# Patient Record
Sex: MALE | Race: BLACK OR AFRICAN AMERICAN | Employment: FULL TIME | URBAN - METROPOLITAN AREA
[De-identification: names, ages, dates, MRNs, and addresses within clinical notes are randomized per-mention and may not be internally consistent; named-entity substitution may affect disease eponyms.]

---

## 2022-02-24 ENCOUNTER — APPOINTMENT (OUTPATIENT)
Dept: CT IMAGING | Facility: CLINIC | Age: 32
End: 2022-02-24
Attending: EMERGENCY MEDICINE
Payer: COMMERCIAL

## 2022-02-24 ENCOUNTER — HOSPITAL ENCOUNTER (OUTPATIENT)
Facility: CLINIC | Age: 32
Setting detail: OBSERVATION
Discharge: HOME | End: 2022-02-25
Attending: EMERGENCY MEDICINE | Admitting: EMERGENCY MEDICINE
Payer: COMMERCIAL

## 2022-02-24 ENCOUNTER — APPOINTMENT (OUTPATIENT)
Dept: MRI IMAGING | Facility: CLINIC | Age: 32
End: 2022-02-24
Attending: INTERNAL MEDICINE
Payer: COMMERCIAL

## 2022-02-24 DIAGNOSIS — R41.82 ALTERED MENTAL STATUS, UNSPECIFIED ALTERED MENTAL STATUS TYPE: ICD-10-CM

## 2022-02-24 LAB
ALBUMIN SERPL-MCNC: 3.5 G/DL (ref 3.4–5)
ALBUMIN UR-MCNC: NEGATIVE MG/DL
ALP SERPL-CCNC: 80 U/L (ref 40–150)
ALT SERPL W P-5'-P-CCNC: 25 U/L (ref 0–70)
AMMONIA PLAS-SCNC: 25 UMOL/L (ref 10–50)
AMPHETAMINES UR QL SCN: NORMAL
ANION GAP SERPL CALCULATED.3IONS-SCNC: 4 MMOL/L (ref 3–14)
APPEARANCE CSF: CLEAR
APPEARANCE UR: CLEAR
AST SERPL W P-5'-P-CCNC: 12 U/L (ref 0–45)
BARBITURATES UR QL: NORMAL
BASOPHILS # BLD MANUAL: 0.1 10E3/UL (ref 0–0.2)
BASOPHILS NFR BLD MANUAL: 1 %
BENZODIAZ UR QL: NORMAL
BILIRUB DIRECT SERPL-MCNC: 0.1 MG/DL (ref 0–0.2)
BILIRUB SERPL-MCNC: 0.6 MG/DL (ref 0.2–1.3)
BILIRUB UR QL STRIP: NEGATIVE
BUN SERPL-MCNC: 12 MG/DL (ref 7–30)
C GATTII+NEOFOR DNA CSF QL NAA+NON-PROBE: NEGATIVE
CALCIUM SERPL-MCNC: 9.2 MG/DL (ref 8.5–10.1)
CANNABINOIDS UR QL SCN: NORMAL
CHLORIDE BLD-SCNC: 105 MMOL/L (ref 94–109)
CMV DNA CSF QL NAA+NON-PROBE: NEGATIVE
CO2 SERPL-SCNC: 28 MMOL/L (ref 20–32)
COCAINE UR QL: NORMAL
COLOR CSF: COLORLESS
COLOR UR AUTO: NORMAL
CREAT SERPL-MCNC: 1.23 MG/DL (ref 0.66–1.25)
CRP SERPL-MCNC: <2.9 MG/L (ref 0–8)
E COLI K1 AG CSF QL: NEGATIVE
EOSINOPHIL # BLD MANUAL: 0.3 10E3/UL (ref 0–0.7)
EOSINOPHIL NFR BLD MANUAL: 4 %
ERYTHROCYTE [DISTWIDTH] IN BLOOD BY AUTOMATED COUNT: 13.6 % (ref 10–15)
ERYTHROCYTE [SEDIMENTATION RATE] IN BLOOD BY WESTERGREN METHOD: 16 MM/HR (ref 0–15)
EV RNA SPEC QL NAA+PROBE: NEGATIVE
FLUAV RNA SPEC QL NAA+PROBE: NEGATIVE
FLUBV RNA RESP QL NAA+PROBE: NEGATIVE
FOLATE SERPL-MCNC: 11.6 NG/ML
GFR SERPL CREATININE-BSD FRML MDRD: 79 ML/MIN/1.73M2
GLUCOSE BLD-MCNC: 101 MG/DL (ref 70–99)
GLUCOSE BLDC GLUCOMTR-MCNC: 83 MG/DL (ref 70–99)
GLUCOSE CSF-MCNC: 64 MG/DL (ref 40–70)
GLUCOSE UR STRIP-MCNC: NEGATIVE MG/DL
GP B STREP DNA CSF QL NAA+NON-PROBE: NEGATIVE
GRAM STAIN RESULT: NORMAL
HAEM INFLU DNA CSF QL NAA+NON-PROBE: NEGATIVE
HCT VFR BLD AUTO: 49.1 % (ref 40–53)
HGB BLD-MCNC: 15.9 G/DL (ref 13.3–17.7)
HGB UR QL STRIP: NEGATIVE
HHV6 DNA CSF QL NAA+NON-PROBE: NEGATIVE
HSV1 DNA CSF QL NAA+NON-PROBE: NEGATIVE
HSV2 DNA CSF QL NAA+NON-PROBE: NEGATIVE
KETONES UR STRIP-MCNC: NEGATIVE MG/DL
L MONOCYTOG DNA CSF QL NAA+NON-PROBE: NEGATIVE
LEUKOCYTE ESTERASE UR QL STRIP: NEGATIVE
LYMPHOCYTES # BLD MANUAL: 3.3 10E3/UL (ref 0.8–5.3)
LYMPHOCYTES NFR BLD MANUAL: 51 %
MALARIA SMEAR BLD: NEGATIVE
MCH RBC QN AUTO: 25.3 PG (ref 26.5–33)
MCHC RBC AUTO-ENTMCNC: 32.4 G/DL (ref 31.5–36.5)
MCV RBC AUTO: 78 FL (ref 78–100)
MONOCYTES # BLD MANUAL: 0.5 10E3/UL (ref 0–1.3)
MONOCYTES NFR BLD MANUAL: 7 %
N MEN DNA CSF QL NAA+NON-PROBE: NEGATIVE
NEUTROPHILS # BLD MANUAL: 2.4 10E3/UL (ref 1.6–8.3)
NEUTROPHILS NFR BLD MANUAL: 37 %
NITRATE UR QL: NEGATIVE
OPIATES UR QL SCN: NORMAL
PARECHOVIRUS A RNA CSF QL NAA+NON-PROBE: NEGATIVE
PCP UR QL SCN: NORMAL
PH UR STRIP: 6.5 [PH] (ref 5–7)
PLASMODIUM AG BLD QL IA: NEGATIVE
PLASMODIUM AG BLD QL IA: NEGATIVE
PLAT MORPH BLD: ABNORMAL
PLATELET # BLD AUTO: 309 10E3/UL (ref 150–450)
POTASSIUM BLD-SCNC: 4.5 MMOL/L (ref 3.4–5.3)
PROT CSF-MCNC: 13 MG/DL (ref 15–60)
PROT SERPL-MCNC: 7.5 G/DL (ref 6.8–8.8)
RBC # BLD AUTO: 6.29 10E6/UL (ref 4.4–5.9)
RBC # CSF MANUAL: 0 /UL (ref 0–2)
RBC MORPH BLD: ABNORMAL
RBC URINE: 0 /HPF
S PNEUM DNA CSF QL NAA+NON-PROBE: NEGATIVE
SARS-COV-2 RNA RESP QL NAA+PROBE: NEGATIVE
SODIUM SERPL-SCNC: 137 MMOL/L (ref 133–144)
SP GR UR STRIP: 1.01 (ref 1–1.03)
TSH SERPL DL<=0.005 MIU/L-ACNC: 1.31 MU/L (ref 0.4–4)
TUBE # CSF: 4
UROBILINOGEN UR STRIP-MCNC: NORMAL MG/DL
VARIANT LYMPHS BLD QL SMEAR: PRESENT
VIT B12 SERPL-MCNC: 669 PG/ML (ref 193–986)
VZV DNA CSF QL NAA+NON-PROBE: NEGATIVE
WBC # BLD AUTO: 6.5 10E3/UL (ref 4–11)
WBC # CSF MANUAL: 0 /UL (ref 0–5)
WBC URINE: <1 /HPF

## 2022-02-24 PROCEDURE — 87015 SPECIMEN INFECT AGNT CONCNTJ: CPT | Performed by: HOSPITALIST

## 2022-02-24 PROCEDURE — G0378 HOSPITAL OBSERVATION PER HR: HCPCS

## 2022-02-24 PROCEDURE — 80307 DRUG TEST PRSMV CHEM ANLYZR: CPT | Performed by: EMERGENCY MEDICINE

## 2022-02-24 PROCEDURE — 87899 AGENT NOS ASSAY W/OPTIC: CPT | Performed by: EMERGENCY MEDICINE

## 2022-02-24 PROCEDURE — 82945 GLUCOSE OTHER FLUID: CPT | Performed by: EMERGENCY MEDICINE

## 2022-02-24 PROCEDURE — 81003 URINALYSIS AUTO W/O SCOPE: CPT | Performed by: EMERGENCY MEDICINE

## 2022-02-24 PROCEDURE — 62270 DX LMBR SPI PNXR: CPT

## 2022-02-24 PROCEDURE — 70450 CT HEAD/BRAIN W/O DYE: CPT

## 2022-02-24 PROCEDURE — 82746 ASSAY OF FOLIC ACID SERUM: CPT | Performed by: PSYCHIATRY & NEUROLOGY

## 2022-02-24 PROCEDURE — C9803 HOPD COVID-19 SPEC COLLECT: HCPCS

## 2022-02-24 PROCEDURE — A9585 GADOBUTROL INJECTION: HCPCS | Performed by: HOSPITALIST

## 2022-02-24 PROCEDURE — 87207 SMEAR SPECIAL STAIN: CPT | Performed by: HOSPITALIST

## 2022-02-24 PROCEDURE — 70553 MRI BRAIN STEM W/O & W/DYE: CPT

## 2022-02-24 PROCEDURE — 255N000002 HC RX 255 OP 636: Performed by: HOSPITALIST

## 2022-02-24 PROCEDURE — 82607 VITAMIN B-12: CPT | Performed by: PSYCHIATRY & NEUROLOGY

## 2022-02-24 PROCEDURE — 82140 ASSAY OF AMMONIA: CPT | Performed by: PSYCHIATRY & NEUROLOGY

## 2022-02-24 PROCEDURE — 36415 COLL VENOUS BLD VENIPUNCTURE: CPT | Performed by: PSYCHIATRY & NEUROLOGY

## 2022-02-24 PROCEDURE — 36415 COLL VENOUS BLD VENIPUNCTURE: CPT | Performed by: HOSPITALIST

## 2022-02-24 PROCEDURE — 36415 COLL VENOUS BLD VENIPUNCTURE: CPT | Performed by: EMERGENCY MEDICINE

## 2022-02-24 PROCEDURE — 87798 DETECT AGENT NOS DNA AMP: CPT | Performed by: EMERGENCY MEDICINE

## 2022-02-24 PROCEDURE — 89050 BODY FLUID CELL COUNT: CPT | Performed by: EMERGENCY MEDICINE

## 2022-02-24 PROCEDURE — 87483 CNS DNA AMP PROBE TYPE 12-25: CPT | Performed by: EMERGENCY MEDICINE

## 2022-02-24 PROCEDURE — 99219 PR INITIAL OBSERVATION CARE,LEVEL II: CPT | Performed by: HOSPITALIST

## 2022-02-24 PROCEDURE — 87636 SARSCOV2 & INF A&B AMP PRB: CPT | Performed by: EMERGENCY MEDICINE

## 2022-02-24 PROCEDURE — 250N000011 HC RX IP 250 OP 636: Performed by: INTERNAL MEDICINE

## 2022-02-24 PROCEDURE — 86788 WEST NILE VIRUS AB IGM: CPT | Performed by: EMERGENCY MEDICINE

## 2022-02-24 PROCEDURE — 87205 SMEAR GRAM STAIN: CPT | Performed by: EMERGENCY MEDICINE

## 2022-02-24 PROCEDURE — 84443 ASSAY THYROID STIM HORMONE: CPT | Performed by: EMERGENCY MEDICINE

## 2022-02-24 PROCEDURE — 80053 COMPREHEN METABOLIC PANEL: CPT | Performed by: EMERGENCY MEDICINE

## 2022-02-24 PROCEDURE — 84157 ASSAY OF PROTEIN OTHER: CPT | Performed by: EMERGENCY MEDICINE

## 2022-02-24 PROCEDURE — 99285 EMERGENCY DEPT VISIT HI MDM: CPT | Mod: 25

## 2022-02-24 PROCEDURE — 82248 BILIRUBIN DIRECT: CPT | Performed by: PSYCHIATRY & NEUROLOGY

## 2022-02-24 PROCEDURE — 86140 C-REACTIVE PROTEIN: CPT | Performed by: PSYCHIATRY & NEUROLOGY

## 2022-02-24 PROCEDURE — 85652 RBC SED RATE AUTOMATED: CPT | Performed by: PSYCHIATRY & NEUROLOGY

## 2022-02-24 PROCEDURE — 87015 SPECIMEN INFECT AGNT CONCNTJ: CPT | Mod: 59 | Performed by: EMERGENCY MEDICINE

## 2022-02-24 PROCEDURE — 85027 COMPLETE CBC AUTOMATED: CPT | Performed by: EMERGENCY MEDICINE

## 2022-02-24 PROCEDURE — 86789 WEST NILE VIRUS ANTIBODY: CPT | Performed by: EMERGENCY MEDICINE

## 2022-02-24 RX ORDER — ONDANSETRON 4 MG/1
4 TABLET, ORALLY DISINTEGRATING ORAL EVERY 6 HOURS PRN
Status: DISCONTINUED | OUTPATIENT
Start: 2022-02-24 | End: 2022-02-25 | Stop reason: HOSPADM

## 2022-02-24 RX ORDER — ACETAMINOPHEN 650 MG/1
650 SUPPOSITORY RECTAL EVERY 4 HOURS PRN
Status: DISCONTINUED | OUTPATIENT
Start: 2022-02-24 | End: 2022-02-25 | Stop reason: HOSPADM

## 2022-02-24 RX ORDER — ONDANSETRON 2 MG/ML
4 INJECTION INTRAMUSCULAR; INTRAVENOUS EVERY 6 HOURS PRN
Status: DISCONTINUED | OUTPATIENT
Start: 2022-02-24 | End: 2022-02-25 | Stop reason: HOSPADM

## 2022-02-24 RX ORDER — ACETAMINOPHEN 325 MG/1
650 TABLET ORAL EVERY 4 HOURS PRN
Status: DISCONTINUED | OUTPATIENT
Start: 2022-02-24 | End: 2022-02-25 | Stop reason: HOSPADM

## 2022-02-24 RX ORDER — LORAZEPAM 2 MG/ML
.5-1 INJECTION INTRAMUSCULAR ONCE
Status: COMPLETED | OUTPATIENT
Start: 2022-02-24 | End: 2022-02-24

## 2022-02-24 RX ORDER — GADOBUTROL 604.72 MG/ML
9 INJECTION INTRAVENOUS ONCE
Status: COMPLETED | OUTPATIENT
Start: 2022-02-24 | End: 2022-02-24

## 2022-02-24 RX ADMIN — LORAZEPAM 1 MG: 2 INJECTION INTRAMUSCULAR; INTRAVENOUS at 11:10

## 2022-02-24 RX ADMIN — GADOBUTROL 9 ML: 604.72 INJECTION INTRAVENOUS at 11:24

## 2022-02-24 ASSESSMENT — ENCOUNTER SYMPTOMS
WEAKNESS: 1
FATIGUE: 1
SORE THROAT: 0
ABDOMINAL PAIN: 0
HEADACHES: 1
COUGH: 0
CONFUSION: 1
DIZZINESS: 1
NAUSEA: 0

## 2022-02-24 NOTE — ED NOTES
[facility]  ED Nurse Handoff Report    ED Chief complaint: Generalized Weakness      ED Diagnosis:   Final diagnoses:   Altered mental status, unspecified altered mental status type       Code Status: MD to plan    Allergies: No Known Allergies    Patient Story: Pt and significant other recently traveled to [facility] where pt became intermittently confused, tired and weak beginning last Friday/Saturday. Pt experienced intermittent worsening symptoms including dizziness and headache during air travel and was encouraged to go to ED upon arrival to [place]. Significant other endorses pt fell before they left for travel on 2/12/22, it is not confirmed if pt hit his head. Pt denies nausea, SOB or pain. PMH of HTN. Family history of Aneurysms.    Focused Assessment:  Pt lethargic, Oriented to self and significant other, arouses to voice. HTN.     Treatments and/or interventions provided: UA, drug screen, LP, Labs, head CT.   Patient's response to treatments and/or interventions:     To be done/followed up on inpatient unit:      Does this patient have any cognitive concerns?: Orientation waxes and wanes, pt was oriented to self on arrival but is now less hazy and more alert.    Activity level - Baseline/Home:  Independent  Activity Level - Current:   Independent    Patient's Preferred language: English   Needed?: No    Isolation: Awaiting Lumbar puncture results.  Infection: Awaiting Lumbar puncture results.  Patient tested for COVID 19 prior to admission: YES  Bariatric?: No    Vital Signs:   Vitals:    02/24/22 0345 02/24/22 0400 02/24/22 0415 02/24/22 0515   BP: (!) 148/87 (!) 147/87 (!) 157/96 (!) 149/88   Pulse: 79 63 70 65   Resp:       Temp:       TempSrc:       SpO2: (!) 89% 97% 99%    Weight:       Height:           Cardiac Rhythm:     Was the PSS-3 completed:   Yes  What interventions are required if any?               Family Comments: Pt on layover, stuck in [place] due to weather in [place] until  Saturday or Sunday. Significant other staying in local [facility].   OBS brochure/video discussed/provided to patient/family: No              Name of person given brochure if not patient:               Relationship to patient:     For the majority of the shift this patient's behavior was Green.   Behavioral interventions performed were .    ED NURSE PHONE NUMBER: [phone]

## 2022-02-24 NOTE — PLAN OF CARE
Date & Time: 2/24 3167-2426  Diagnosis: AMS - possible transient global amnesia   Procedures: 2/23 - LP   Orientation/Cognitive: AOx1, waxes/wanes  VS/O2: VSS ex HTN, RA  Mobility: Independent   Diet: Regular  Pain Management: Denies  Bowel & Bladder: Continent   Skin: WDL  Abnormal Labs: WDL - parasitology pending   Tele: NA  IV Access/Drips/Fluids: PIV SL   Drains: NA  Tests: CT - negative, UA - WDL, drug screen - negative, LP - negative, MRI - pending, parasitology panel - pending   Consults: ID, hospitalist, neurology   Discharge Plan: Pending   Other: Unable to process some commands, knows name, confused to situation    Obs Goals:  -diagnostic tests and consults completed and resulted: not met - in process   -vital signs normal or at patient baseline: met   -returns to baseline functional status: physically met, cognitively not met

## 2022-02-24 NOTE — PHARMACY-ADMISSION MEDICATION HISTORY
Pharmacy Medication History  Admission medication history interview status for the 2/24/2022  admission is complete. See EPIC admission navigator for prior to admission medications     Location of Interview: Phone  Medication history sources: Patient's family/friend (Significant Other), Surescripts and Care Everywhere    Significant changes made to the medication list:  None      Additional medication history information:   Alberta confirmed the patient is not taking any prescription or OTC meds at this time.    Medication reconciliation completed by provider prior to medication history? No    Time spent in this activity: 15 mins    Prior to Admission medications    Not on File       The information provided in this note is only as accurate as the sources available at the time of update(s)     Marti Oh, MarilynD

## 2022-02-24 NOTE — PROVIDER NOTIFICATION
MD Notification    Notified Person: MD     Notified Person Name:  Dr. Loera    Notification Date/Time:  2/24/22  4165    Notification Interaction: text    Purpose of Notification: FYI: Pt complaining of mild headache. Can he have any prn tylenol please? Thanks     Orders Received: order received for prn tylenol.    Comments:

## 2022-02-24 NOTE — ED PROVIDER NOTES
"  History   Chief Complaint:  Generalized Weakness       HPI   Ana Negrete is a 31 year old male who presents with confusion and generalized weakness. The patient's wife reports that they were travelling in [place] and the patient started to experience intermittent episodes of fatigue and confusion 6 days ago while at the beach. She states that since then, he has had moments where he wakes up and is acting like himself, but then becomes very tired, weak, and confused. She states that he has been having memory loss and confusion surrounding the events of their travel. They left [facility] yesterday and arrived back in the [place] tonight. On the airplane here, a doctor on the flight noticed that he did not look well and they came from the airport to the hospital. His wife says that he complained of a headache yesterday and dizziness today. He has not had a fever or body aches. His wife says that he drinks beer a couple times per week, but has not had any alcohol since the onset of his symptoms. Denies drug use. She notes that he mentioned that he fell several weeks ago, but was completely normal after that. The patient denies cough, sore throat, nausea, chest pain, or abdominal pain. He just states that he is tired and says \"I don't know how I got here\".     Review of Systems   Constitutional: Positive for fatigue.   HENT: Negative for sore throat.    Respiratory: Negative for cough.    Cardiovascular: Negative for chest pain.   Gastrointestinal: Negative for abdominal pain and nausea.   Neurological: Positive for dizziness, weakness and headaches.   Psychiatric/Behavioral: Positive for confusion.   All other systems reviewed and are negative.    Allergies:  The patient has no known allergies.     Medications:  The patient is not currently taking any prescribed medications.    Past Medical History:     The patient denies any significant past medical history.    Social History:  Patient presents with his " "wife    Physical Exam     Patient Vitals for the past 24 hrs:   BP Temp Temp src Pulse Resp SpO2 Height Weight   02/24/22 0545 (!) 151/81 -- -- 68 -- -- -- --   02/24/22 0530 (!) 139/91 -- -- 82 -- -- -- --   02/24/22 0515 (!) 149/88 -- -- 65 -- -- -- --   02/24/22 0415 (!) 157/96 -- -- 70 -- 99 % -- --   02/24/22 0400 (!) 147/87 -- -- 63 -- 97 % -- --   02/24/22 0345 (!) 148/87 -- -- 79 -- (!) 89 % -- --   02/24/22 0330 (!) 172/108 -- -- 70 -- 99 % -- --   02/24/22 0315 (!) 167/107 -- -- 70 -- 98 % -- --   02/24/22 0300 (!) 161/107 -- -- 69 -- 98 % -- --   02/24/22 0245 -- -- -- -- -- 100 % -- --   02/24/22 0230 -- -- -- -- -- 100 % -- --   02/24/22 0215 -- -- -- -- -- 99 % -- --   02/24/22 0200 -- -- -- -- -- (!) 86 % -- --   02/24/22 0115 -- -- -- 85 -- 97 % -- --   02/24/22 0100 136/85 98.1  F (36.7  C) Oral 73 -- 99 % -- --   02/24/22 0045 136/76 -- -- 59 -- 98 % -- --   02/24/22 0030 (!) 161/113 -- -- 68 -- 99 % -- --   02/24/22 0007 (!) 164/90 97.4  F (36.3  C) Temporal 65 17 99 % 1.778 m (5' 10\") 90.7 kg (200 lb)       Physical Exam  General: Appears well-developed and well-nourished.   Head: No signs of trauma.   Mouth/Throat: Oropharynx is clear and moist.   Eyes: Conjunctivae are normal. Pupils are equal, round, and reactive to light.   Neck: Normal range of motion. No nuchal rigidity. No cervical adenopathy  CV: Normal rate and regular rhythm.    Resp: Effort normal and breath sounds normal. No respiratory distress.   GI: Soft. There is no tenderness.  No rebound or guarding.  Normal bowel sounds.  No CVA tenderness.  MSK: Normal range of motion. no edema. No Calf tenderness.  Neuro: The patient is alert and oriented to person, place, and time, but very slow to respond and only answers I short answers.  PERRLA, EOMI, strength in upper/lower extremities normal and symmetrical. Sensation normal.   Skin: Skin is warm and dry. No rash noted.       Emergency Department Course     Imaging:  Head CT w/o " contrast   Final Result   IMPRESSION:   1.  Unremarkable noncontrast head CT.        Report per radiology    Laboratory:  Labs Ordered and Resulted from Time of ED Arrival to Time of ED Departure   BASIC METABOLIC PANEL - Abnormal       Result Value    Sodium 137      Potassium 4.5      Chloride 105      Carbon Dioxide (CO2) 28      Anion Gap 4      Urea Nitrogen 12      Creatinine 1.23      Calcium 9.2      Glucose 101 (*)     GFR Estimate 79     CBC WITH PLATELETS AND DIFFERENTIAL - Abnormal    WBC Count 6.5      RBC Count 6.29 (*)     Hemoglobin 15.9      Hematocrit 49.1      MCV 78      MCH 25.3 (*)     MCHC 32.4      RDW 13.6      Platelet Count 309     DIFFERENTIAL - Abnormal    % Neutrophils 37      % Lymphocytes 51      % Monocytes 7      % Eosinophils 4      % Basophils 1      Absolute Neutrophils 2.4      Absolute Lymphocytes 3.3      Absolute Monocytes 0.5      Absolute Eosinophils 0.3      Absolute Basophils 0.1      RBC Morphology Confirmed RBC Indices      Platelet Assessment        Value: Automated Count Confirmed. Platelet morphology is normal.    Reactive Lymphocytes Present (*)    TSH WITH FREE T4 REFLEX - Normal    TSH 1.31     INFLUENZA A/B & SARS-COV2 PCR MULTIPLEX - Normal    Influenza A PCR Negative      Influenza B PCR Negative      SARS CoV2 PCR Negative     GLUCOSE BY METER - Normal    GLUCOSE BY METER POCT 83     ROUTINE UA WITH MICROSCOPIC REFLEX TO CULTURE - Normal    Color Urine Straw      Appearance Urine Clear      Glucose Urine Negative      Bilirubin Urine Negative      Ketones Urine Negative      Specific Gravity Urine 1.007      Blood Urine Negative      pH Urine 6.5      Protein Albumin Urine Negative      Urobilinogen Urine Normal      Nitrite Urine Negative      Leukocyte Esterase Urine Negative      RBC Urine 0      WBC Urine <1     DRUG ABUSE SCREEN 77 URINE (FL, RH, SH) - Normal    Amphetamines Urine Screen Negative      Barbiturates Urine Screen Negative       Benzodiazepines Urine Screen Negative      Cannabinoids Urine Screen Negative      Cocaine Urine Screen Negative      Opiates Urine Screen Negative      PCP Urine Screen Negative        Procedures:    Lumbar Puncture      Indication:  confusion       Consent:  Risks (including but not limited to; infection, bleeding, spinal headache with possibility of spinal patch and temporary or permanent neurologic injury), benefits and alternatives were discussed with patient and spouse and consent for procedure was obtained.     Timeout:  Universal protocol was followed. TIME OUT conducted just prior to starting procedure confirmed patient identity, site/side, procedure, patient position, and availability of correct equipment and implants? Yes     Medication:  Lidocaine: Local infiltration     Procedure Note:  Patient was placed in a left lateral decubitus position.  The low back was prepped with Betadine.  The patient was medicated as above.  A spinal needle was used to gain access to the subarachnoid space with stylet in place. Opening pressure was 14 cm H2O.  The fluid was clear.  Stylet was replaced and needle withdrawn.     Patient Status:  Patient tolerated the procedure well.  There were no complications.      Emergency Department Course:       Reviewed:  I reviewed nursing notes, vitals, past medical history and Care Everywhere    Assessments:  0027 I obtained history and examined the patient as noted above.   0258 I rechecked the patient and explained findings.   0554 I performed lumbar puncture per note above.    Consults:  0535 I spoke with Dr. Gaitan, hospitalist, regarding the patient    Disposition:  The patient was admitted to the hospital under the care of Dr. Gaitan.     Impression & Plan     Medical Decision Making:  Ana Negrete is a 31-year-old gentleman who presents due to altered mental status.  He was traveling in [place] and over the last number of days has had intermittent significant fatigue and  seems to be quite slow.  The symptoms seem to come and go and it persisted during his travels home.  At initial presentation the patient was alert and could state his name and the month but seemed to be very slow and out of it.  He denied any other complaints and his wife has not noticed any other symptoms such as fevers, rigors, headache, chest pain, abdominal pain, or other such symptoms during these episodes.  Initial blood work, urine, and a CT scan of the head did not show any signs of acute process.  I went back in the room to reevaluate the patient and discussed doing a lumbar puncture.  At that time the patient was much more awake and alert.  He states that he could remember everything that was going on but feels overwhelmed during these periods and feels like he cannot respond.  I discussed my concerns and my recommendation to do a lumbar puncture.  Initially the patient was quite hesitant with this and declined despite myself, his wife, and other family trying to talk with the patient.  I ultimately did recommend admission for continued monitoring as the symptoms seem to come and go over the last number of days and the etiology is unclear.  Certainly think infectious etiology is possible though is not having other symptoms.  I also consider the possibility of absence seizure and post ictal phases.  Patient did agree to admission for continued monitoring.  After some time he ultimately did agree to having a lumbar puncture done and this was completed without difficulty.  Patient is the admitted to the hospitalist service for continued work-up and monitoring.    Diagnosis:    ICD-10-CM    1. Altered mental status, unspecified altered mental status type  R41.82        Scribe Disclosure:  I, Titaalvarez Reid, am serving as a scribe at 12:26 AM on 2/24/2022 to document services personally performed by Luis E Redding MD based on my observations and the provider's statements to me.          Luis E Redding,  MD  02/24/22 0703

## 2022-02-24 NOTE — H&P
[facility]    History and Physical - Hospitalist Service       Date of Admission:  2/24/2022    Assessment & Plan      Ana Negrete is a 31 year old male admitted on 2/24/2022. He has history of HTN and travelled to [place] on 12th and has no presented en route to [place] with CC of fatigue and confusion .     1) encephalopathy  -On admission presented with recent travel to [place] and has intermittent confusion for the past 4 to 5 days without any fever or chills or neck rigidity  -On my exam today patient was able to tell me where he was, name of  presidents of [place], he was able to tell me where he lives, does not have any photophobia  -His infectious work-up has all been negative including normal WBC count, negative for COVID-19 and influenza, CT scan of the head did not show any evidence of brain bleed and his UA is negative for infection  -Also on my exam he does not display any signs of meningitis at this time  -Differentials in the patient can include encephalitis versus meningitis given his recent travel to high risk area and also [place]  -The lumbar puncture is pending and we will need to follow the results of the same and meningitis panel will also be sent  -I have placed consult to neurology  -We will also monitor his neuro signs every 4 hours  -Based on the results of the lumbar puncture, consider consult to infectious disease    2) hypertension  -Patient does have history of hypertension and is not currently on any blood pressure medications and we will monitor his blood pressure closely     Diet:  Regular   DVT Prophylaxis: Low Risk/Ambulatory with no VTE prophylaxis indicated  Bojorquez Catheter: Not present  Central Lines: None  Cardiac Monitoring: None  Code Status:  full code    Clinically Significant Risk Factors Present on Admission                 # Overweight: Estimated body mass index is 28.7 kg/m  as calculated from the following:    Height as of this encounter:  "1.778 m (5' 10\").    Weight as of this encounter: 90.7 kg (200 lb).      Disposition Plan   Expected Discharge:    Anticipated discharge location:  Awaiting care coordination huddle  Delays:           The patient's care was discussed with the Bedside Nurse, Patient, Patient's Family and ED Physician     Jameson Gaitan MD  Hospitalist Service  [facility]  Securely message with the Vocera Web Console (learn more here)  Text page via [employer] Paging/Directory         ______________________________________________________________________    Chief Complaint     Fatigue and recent confusion    History is obtained from the patient and talking to his fiance and he gave permission to talk to her     History of Present Illness   Ana Negrete is a 31 year old male who has medical history which includes hypertension and is not taking any medications and he lives in [place] and went with his fiancée to [facility] and [facility] on 12th of this month and he was brought to the ER as he was an route from [facility] to his home in [place] and for the past 4 to 5 days patient has been fatigued and he has intermittent confusion.  History was obtained by talking with the patient and his fiancée and he gave permission.    The couple went to [facility] on 12th and they were in [facility] and then went on [facility] and were also in [facility] and according to the fiancé they might have had a mosquito bite but does not recollect and she has noticed that since the past 3 to 4 days patient is more fatigued and he is not his usual self and has been forgetting things.  She denies any fever and chills and the patient but did mention that even before boarding the plane in [facility] patient was wobbly.  Patient has waxing and waning mental status.  Patient denies any nausea, vomiting, abdominal pain, chest pain, shortness of breath, tingling or numbness anywhere else in the body, headache, photophobia, vision changes or any neck rigidity.  He denies " any focal deficits    In the ER patient had lab work done which was unremarkable and was negative for COVID-19 and the ED physician did offer lumbar puncture to the patient which she declined initially.  He also had the CT scan of the head done which did not show any acute process and UA did not show any evidence of infection.  Patient has agreed to go for lumbar puncture    I did see him in the ER room #29 and patient was able to give me a good history and does agree for lumbar puncture    Review of Systems    The 10 point Review of Systems is negative other than noted in the HPI or here.     Past Medical History    I have reviewed this patient's medical history and updated it with pertinent information if needed.     HTN     Past Surgical History   I have reviewed this patient's surgical history and updated it with pertinent information if needed.    No surgery in past    Social History   I have reviewed this patient's social history and updated it with pertinent information if needed.  Social History     Tobacco Use     Smoking status: Not on file     Smokeless tobacco: Not on file   Substance Use Topics     Alcohol use: Not on file     Drug use: Not on file       Family History   I have reviewed this patient's family history and updated it with pertinent information if needed.  Family History   Problem Relation Age of Onset     Hypertension Father            Prior to Admission Medications   None     Allergies   No Known Allergies    Physical Exam   Vital Signs: Temp: 98.1  F (36.7  C) Temp src: Oral BP: (!) 151/81 Pulse: 68   Resp: 17 SpO2: 99 % O2 Device: None (Room air)    Weight: 200 lbs 0 oz        General: Patient appears comfortable and in no acute distress.  HEENT: Head is atraumatic, normocephalic.  Pupils are equal, round and reactive to light.  No scleral icterus. Conjunctiva are pink and without injection.   Neck: Neck is supple .Thyroid is not palpable.  Lymphatic: No Ln maral  Respiratory: Lungs are  clear to auscultation bilaterally. No wheezes, rhonchi bilaterally.  Cardiovascular: Regular rate and rhythm.  Normal S1 and S2.  No murmurs, rubs, or gallops.  Radial and dorsalis pedis and  are 2+ bilaterally.  No jugular venous distention present.  No pretibial edema noted.  Abdomen:   Soft, No guarding, rigidity.  Normoactive bowel sounds.  Non-tender to palpation.  No masses are appreciated.  Skin: No skin rashes or lesions to inspection or palpation.  Neurologic: Higher functions are within normal limits. No obvious defects in speech, language and memory. Cranial nerves II through XII are grossly intact and symmetric.   Sensation is intact to light touch in the upper and lower extremities bilaterally. I did check for finger nose and it intact  Musculoskeletal:strength is 5/5 in upper and lower    Psychiatric: cooperative.      Data   Data reviewed today: I reviewed all medications, new labs and imaging results over the last 24 hours. I personally reviewed the head CT image(s) showing unremarkable.    Recent Labs   Lab 02/24/22 0114 02/24/22 0113 02/24/22 0023   WBC 6.5  --   --    HGB 15.9  --   --    MCV 78  --   --      --   --    NA  --  137  --    POTASSIUM  --  4.5  --    CHLORIDE  --  105  --    CO2  --  28  --    BUN  --  12  --    CR  --  1.23  --    ANIONGAP  --  4  --    BIB  --  9.2  --    GLC  --  101* 83     Most Recent 3 CBC's:Recent Labs   Lab Test 02/24/22 0114   WBC 6.5   HGB 15.9   MCV 78        Most Recent 3 BMP's:Recent Labs   Lab Test 02/24/22 0113 02/24/22 0023     --    POTASSIUM 4.5  --    CHLORIDE 105  --    CO2 28  --    BUN 12  --    CR 1.23  --    ANIONGAP 4  --    BIB 9.2  --    * 83     6.5    \    15.9    /    309   N 37    L N/A    137    105    12 /   ------------------------------------ 101 (H)   ALT N/A   AST N/A   AP N/A   ALB N/A   Ca 9.2  4.5    28    1.23 \    % RETIC N/A    LDH N/A  Troponin N/A    BNP N/A    CK N/A  INR N/A   PTT N/A     D-dimer N/A    Fibrinogen N/A    Antithrombin N/A  Ferritin N/A  CRP N/A    IL-6 N/A  Recent Results (from the past 24 hour(s))   Head CT w/o contrast    Narrative    EXAM: CT HEAD W/O CONTRAST  LOCATION: [facility]  DATE/TIME: 2/24/2022 1:18 AM    INDICATION: Confusion and fatigue  COMPARISON: None.  TECHNIQUE: Routine CT Head without IV contrast. Multiplanar reformats. Dose reduction techniques were used.    FINDINGS:  INTRACRANIAL CONTENTS: No intracranial hemorrhage, extraaxial collection, or mass effect.  No CT evidence of acute infarct. Normal parenchymal attenuation. Normal ventricles and sulci.     VISUALIZED ORBITS/SINUSES/MASTOIDS: No intraorbital abnormality. No paranasal sinus mucosal disease. No middle ear or mastoid effusion.    BONES/SOFT TISSUES: No acute abnormality.      Impression    IMPRESSION:  1.  Unremarkable noncontrast head CT.

## 2022-02-24 NOTE — PROGRESS NOTES
Obs Goals:  -diagnostic tests and consults completed and resulted: not met - in process   -vital signs normal or at patient baseline: met   -returns to baseline functional status: physically met, cognitively not met

## 2022-02-24 NOTE — CONSULTS
[facility]    Infectious Disease Consultation     Date of Admission:  2/24/2022  Date of Consult (When I saw the patient): 02/24/22    Assessment & Plan   Ana Negrete is a 31 year old male who was admitted on 2/24/2022.     Impression:  1. 31 y.o male with HTN  2. Recent travel form [place]. Spent 12 days in [facility]/ [facility].   3. Admitted after arrival with mental fog, fatigue, weakness.   4. No fevers or chills, some reports of headache no neck stiffness, some dizziness, ? Mechanical fall before the trip is being reported.   5. Malaria screen negative.   6. Meningitis panel negative, CSF with 0 WBC no meningitis.   7. On focal neurological deficits on exam, completely alert and oriented x3. No rashes.   10. Patient born in [facility], moved to the [place] when 10 years old, last trip to [place] before this one was in 2007. No prior history of pneumonia.      Recommendations:   Low suspicion  for meningitis with that LP, WBC 0, meningitis - encephalitis panel negative.   No fever to suspect malaria, and parasite smear ( prelim) and malaria antigen negative. Will follow up on the final.   CT scan negative, MRI pending will follow.   No CBC abnormalities.   Will follow up on the neurology eval.     Significant other updated bedside    Discussed with ORQUIDEA Moss MD    Reason for Consult   Reason for consult: I was asked to evaluate this patient for fatigue and confusion in a traveller from [place].     Primary Care Physician   Physician No Ref-Primary    Chief Complaint   Fatigue and confusion     History is obtained from the patient and medical records    History of Present Illness   Ana Negrete is a 31 year old male with history of HTN and travelled to [place] on 12th feb and has no presented en route to [place] with CC of fatigue and confusion     Past Medical History   I have reviewed this patient's medical history and updated it with pertinent information if needed.   No past  medical history on file.    Past Surgical History   I have reviewed this patient's surgical history and updated it with pertinent information if needed.  No past surgical history on file.    Prior to Admission Medications   None     Allergies   No Known Allergies    Immunization History     There is no immunization history on file for this patient.    Social History   I have reviewed this patient's social history and updated it with pertinent information if needed. Nkanyiso Ncube      Family History   I have reviewed this patient's family history and updated it with pertinent information if needed.   Family History   Problem Relation Age of Onset     Hypertension Father        Review of Systems   The 10 point Review of Systems is negative other than noted in the HPI or here.     Physical Exam   Temp: 98.5  F (36.9  C) Temp src: Oral BP: (!) 165/93 Pulse: 75   Resp: 16 SpO2: 96 % O2 Device: None (Room air)    Vital Signs with Ranges  Temp:  [97.4  F (36.3  C)-98.5  F (36.9  C)] 98.5  F (36.9  C)  Pulse:  [58-85] 75  Resp:  [16-17] 16  BP: (130-172)/() 165/93  SpO2:  [86 %-100 %] 96 %  200 lbs 0 oz  Body mass index is 28.7 kg/m .    GENERAL APPEARANCE:  awake  EYES: Eyes grossly normal to inspection  NECK: no adenopathy  RESP: lungs clear   CV: regular rates and rhythm  LYMPHATICS: normal ant/post cervical and supraclavicular nodes  ABDOMEN: soft, nontender  MS: extremities normal  SKIN: no suspicious lesions or rashes  Psych: affect normal       Data   Lab Results   Component Value Date    WBC 6.5 02/24/2022    HGB 15.9 02/24/2022    HCT 49.1 02/24/2022     02/24/2022     02/24/2022    POTASSIUM 4.5 02/24/2022    CHLORIDE 105 02/24/2022    CO2 28 02/24/2022    BUN 12 02/24/2022    CR 1.23 02/24/2022     (H) 02/24/2022     No results for input(s): CULT in the last 168 hours.  No lab results found.    Invalid input(s): UC

## 2022-02-24 NOTE — PLAN OF CARE
RECEIVING UNIT ED HANDOFF REVIEW    ED Nurse Handoff Report was reviewed by: Vesta Zhang RN on February 24, 2022 at 6:00 AM

## 2022-02-24 NOTE — ED TRIAGE NOTES
[facility]  ED Arrival Note    Arrives through triage. ABC's intact. A &O X4. . Pt arrives with c/o generalized weakness and feeling sleepy. His  reports that he has not been himself since Saturday. He has been sleeping too much, feeling tired, and having no energy. Recent international travels      Visitors during triage: Spouse      Triage Interventions: Direct rooming     Ambulatory: Yes    Meets Stroke Criteria?: No    Meets Trauma Criteria?: No    Shock Index: <0.8, for provider reference    Directed to: Main ED    Pronouns: he/him

## 2022-02-24 NOTE — CONSULTS
"[facility]    Neurology Consultation     Date of Admission:  2/24/2022    Assessment & Plan   Ana Negrete is a 31 year old male who was admitted on 2/24/2022. I was asked to see the patient for fatigue and episodes of confusion.  Patient recently coming from trip in [place] with episodes of confusion and staring not feeling self and fatigued.  Neurological exam today is normal with exception of questionable memory loss.  The patient is not encephalopathic.  The CSF does not show meningitis.  In the differential diagnoses remains partial simple or complex seizures versus other type of metabolic or structural abnormality.  At this time I would recommend the following:    - MRI head the exam is pending  - EEG to look for epileptiform activity.  - vit B12 folate, vit e, etc    Dependable on results of this testing will do further work-up if necessary.  Of note the patient admits for feeling depressed and stressed.  If old exams are negative this is needs to be pursued as well.        Mariel June MD    Code Status    Full Code    Reason for Consult   Reason for consult: I was asked by dr Gaitan to evaluate this patient for  fatigue and episodes of confusion     Primary Care Physician   Physician No Ref-Primary    Chief Complaint    fatigue and episodes of confusion     History is obtained from the patient    History of Present Illness   Ana Negrete is a 31 year old male who presents with episodes of spacing out.  The patient states that for the last week and a half he has felt very unusual, \"like in a dream but from which he cannot wake up\".  Patient's girlfriend is also present she states that the patient seems dazed, with eyes glazed looking forward and not understanding what is told.  The patient and girlfriend just came from a trip in [facility] and [facility] and just flew from [place] here.  This unusual episode started while in [place] and actually after a big argument with " his girlfriend.  The patient states that he feels unusual emotional.  Patient's girlfriend stated that there were a lot of problems when being at the airport and had to give passport control, the patient became more anxious is approaching paraspinal control and seem not to know what to do when asked for the passport.    The patient denies any weakness numbness tingling.  He denies fever.  Nobody was sick from where they were coming.  There is no history of seizures.  The patient has no other past medical history.  However he admits for may be feeling depressed and emotional.  He has been also fatigued.  There is a questionable history of a mosquito bite.  The patient is lactose intolerant.  He is not a smoker but drinks beer.  Patient states that he drinks beer 3 times a week patient's girlfriend stated that he drinks more than that.  He used to work as a .    On admission the patient has had a CT scan of the head which was a negative study.  Films were reviewed by me.    Lumbar puncture was also negative with 0 white blood cells and so far infectious work-up negative.  Malaria antigen was negative.  Urine toxicology screen was negative.  Thyroid negative.    Past Medical History   Lactose intolerant    Past Surgical History   I have reviewed this patient's surgical history and updated it with pertinent information if needed.  No past surgical history on file.    Prior to Admission Medications   None     Allergies   No Known Allergies    Social History   I have reviewed this patient's social history and updated it with pertinent information if needed.  Not smoking drinks beer almost every day.  He lives in [place] and girlfriend will move from [place].  Ana Negrete      Family History   I have reviewed this patient's family history and updated it with pertinent information if needed.   Family History   Problem Relation Age of Onset     Hypertension Father        Review of Systems   The 10 point  Review of Systems is negative other than noted in the HPI or here.     Physical Exam   Temp: 98  F (36.7  C) Temp src: Oral BP: (!) 157/99 Pulse: 65   Resp: 16 SpO2: 98 % O2 Device: None (Room air)    Vital Signs with Ranges  Temp:  [97.4  F (36.3  C)-98.5  F (36.9  C)] 98  F (36.7  C)  Pulse:  [58-85] 65  Resp:  [16-17] 16  BP: (130-172)/() 157/99  SpO2:  [86 %-100 %] 98 %  200 lbs 0 oz    Constitutional: Normal  Eyes: No conjunctival erythema  ENT: Neck is supple  Respiratory: CTA  Cardiovascular: RRR  Skin: No obvious lesions  Musculoskeletal: No pedal edema  The patient is alert oriented x3 no acute distress.  Speech is fluent there is no aphasia apraxia or agnosia.  Attention was normal.  He is able to spell world forward and backward.  He knows the name of the president and the prior 1.  Memory was 4 out of 4 at 1 minute and only 3 out of 4 at 5 minutes with 1 confabulation.  Later on he remembered the fourth word 2.  Pupils are equal round reactive to light extraocular movements are intact, there is no nystagmus.  Facial muscles are equal bilaterally.  Uvula and tongue are midline.  Sternocleidomastoid and trapezius muscles are normal bilaterally.    Muscular mass tone and strength are normal in all 4 extremities there is no pronator drift.  Reflexes are present symmetric in upper and lower extremities.  Babinski is absent.    Sensory exam is normal to light touch and vibratory sense.    Finger-nose-finger heel-to-shin without dysmetria.  Fine movements are normal.    Gait has normal base.  The patient is able to walk in tandem.  Neuropsychiatric: Anxious    Data   Results for orders placed or performed during the hospital encounter of 02/24/22 (from the past 24 hour(s))   Glucose by meter   Result Value Ref Range    GLUCOSE BY METER POCT 83 70 - 99 mg/dL   Symptomatic; Unknown Influenza A/B & SARS-CoV2 (COVID-19) Virus PCR Multiplex Nasopharyngeal    Specimen: Nasopharyngeal; Swab   Result Value Ref  Range    Influenza A PCR Negative Negative    Influenza B PCR Negative Negative    SARS CoV2 PCR Negative Negative    Narrative    Testing was performed using the oscar SARS-CoV-2 & Influenza A/B Assay on the oscar [employer] System. This test should be ordered for the detection of SARS-CoV-2 and influenza viruses in individuals who meet clinical and/or epidemiological criteria. Test performance is unknown in asymptomatic patients. This test is for in vitro diagnostic use under the FDA EUA for laboratories certified under CLIA to perform moderate and/or high complexity testing. This test has not been FDA cleared or approved. A negative result does not rule out the presence of PCR inhibitors in the specimen or target RNA in concentration below the limit of detection for the assay. If only one viral target is positive but coinfection with multiple targets is suspected, the sample should be re-tested with another FDA cleared, approved or authorized test, if coinfection would change clinical management. [facility] are certified under the Clinical Laboratory Improvement Amendments of 1988 (CLIA-88) as  qualified to perform moderate and/or high complexity laboratory testing.   Basic metabolic panel   Result Value Ref Range    Sodium 137 133 - 144 mmol/L    Potassium 4.5 3.4 - 5.3 mmol/L    Chloride 105 94 - 109 mmol/L    Carbon Dioxide (CO2) 28 20 - 32 mmol/L    Anion Gap 4 3 - 14 mmol/L    Urea Nitrogen 12 7 - 30 mg/dL    Creatinine 1.23 0.66 - 1.25 mg/dL    Calcium 9.2 8.5 - 10.1 mg/dL    Glucose 101 (H) 70 - 99 mg/dL    GFR Estimate 79 >60 mL/min/1.73m2   TSH with free T4 reflex   Result Value Ref Range    TSH 1.31 0.40 - 4.00 mU/L   CBC with platelets differential    Narrative    The following orders were created for panel order CBC with platelets differential.  Procedure                               Abnormality         Status                     ---------                               -----------          ------                     CBC with platelets and d...[[id number]]  Abnormal            Final result               Manual Differential[[id number]]          Abnormal            Final result                 Please view results for these tests on the individual orders.   CBC with platelets and differential   Result Value Ref Range    WBC Count 6.5 4.0 - 11.0 10e3/uL    RBC Count 6.29 (H) 4.40 - 5.90 10e6/uL    Hemoglobin 15.9 13.3 - 17.7 g/dL    Hematocrit 49.1 40.0 - 53.0 %    MCV 78 78 - 100 fL    MCH 25.3 (L) 26.5 - 33.0 pg    MCHC 32.4 31.5 - 36.5 g/dL    RDW 13.6 10.0 - 15.0 %    Platelet Count 309 150 - 450 10e3/uL   Manual Differential   Result Value Ref Range    % Neutrophils 37 %    % Lymphocytes 51 %    % Monocytes 7 %    % Eosinophils 4 %    % Basophils 1 %    Absolute Neutrophils 2.4 1.6 - 8.3 10e3/uL    Absolute Lymphocytes 3.3 0.8 - 5.3 10e3/uL    Absolute Monocytes 0.5 0.0 - 1.3 10e3/uL    Absolute Eosinophils 0.3 0.0 - 0.7 10e3/uL    Absolute Basophils 0.1 0.0 - 0.2 10e3/uL    RBC Morphology Confirmed RBC Indices     Platelet Assessment  Automated Count Confirmed. Platelet morphology is normal.     Automated Count Confirmed. Platelet morphology is normal.    Reactive Lymphocytes Present (A) None Seen   Head CT w/o contrast    Narrative    EXAM: CT HEAD W/O CONTRAST  LOCATION: [facility]  DATE/TIME: 2/24/2022 1:18 AM    INDICATION: Confusion and fatigue  COMPARISON: None.  TECHNIQUE: Routine CT Head without IV contrast. Multiplanar reformats. Dose reduction techniques were used.    FINDINGS:  INTRACRANIAL CONTENTS: No intracranial hemorrhage, extraaxial collection, or mass effect.  No CT evidence of acute infarct. Normal parenchymal attenuation. Normal ventricles and sulci.     VISUALIZED ORBITS/SINUSES/MASTOIDS: No intraorbital abnormality. No paranasal sinus mucosal disease. No middle ear or mastoid effusion.    BONES/SOFT TISSUES: No acute abnormality.      Impression     IMPRESSION:  1.  Unremarkable noncontrast head CT.   UA with Microscopic reflex to Culture    Specimen: Urine, Clean Catch   Result Value Ref Range    Color Urine Straw Colorless, Straw, Light Yellow, Yellow    Appearance Urine Clear Clear    Glucose Urine Negative Negative mg/dL    Bilirubin Urine Negative Negative    Ketones Urine Negative Negative mg/dL    Specific Gravity Urine 1.007 1.003 - 1.035    Blood Urine Negative Negative    pH Urine 6.5 5.0 - 7.0    Protein Albumin Urine Negative Negative mg/dL    Urobilinogen Urine Normal Normal, 2.0 mg/dL    Nitrite Urine Negative Negative    Leukocyte Esterase Urine Negative Negative    RBC Urine 0 <=2 /HPF    WBC Urine <1 <=5 /HPF    Narrative    Urine Culture not indicated   Urine Drugs of Abuse Screen    Narrative    The following orders were created for panel order Urine Drugs of Abuse Screen.  Procedure                               Abnormality         Status                     ---------                               -----------         ------                     Drug abuse screen 77 uri...[[id number]]  Normal              Final result                 Please view results for these tests on the individual orders.   Drug abuse screen 77 urine (FL, RH, SH)   Result Value Ref Range    Amphetamines Urine Screen Negative Screen Negative    Barbiturates Urine Screen Negative Screen Negative    Benzodiazepines Urine Screen Negative Screen Negative    Cannabinoids Urine Screen Negative Screen Negative    Cocaine Urine Screen Negative Screen Negative    Opiates Urine Screen Negative Screen Negative    PCP Urine Screen Negative Screen Negative   CSF Cell Count with Differential:    Narrative    The following orders were created for panel order CSF Cell Count with Differential:.  Procedure                               Abnormality         Status                     ---------                               -----------         ------                     Cell Count  CSF[[id number]]                                   Final result                 Please view results for these tests on the individual orders.   Cell Count CSF   Result Value Ref Range    Tube Number 4     Color Colorless Colorless    Clarity Clear Clear    Total Nucleated Cells 0 0 - 5 /uL    RBC Count 0 0 - 2 /uL   Glucose CSF:   Result Value Ref Range    Glucose CSF 64 40 - 70 mg/dL    Narrative    CSF glucose concentrations are about 60 percent of normal plasma glucose.  CSF glucose concentrations are about 60 percent of normal plasma glucose.   Protein total CSF:   Result Value Ref Range    Protein total CSF 13 (L) 15 - 60 mg/dL    Narrative    This is a lab developed test. It has not been cleared or approved by the FDA.   Gram stain    Specimen: Lumbar Puncture; Cerebrospinal fluid   Result Value Ref Range    Gram Stain Result     Cerebrospinal fluid Aerobic Bacterial Culture Routine    Specimen: Lumbar Puncture; Cerebrospinal fluid   Result Value Ref Range    Gram Stain Result No organisms seen     Gram Stain Result No white blood cells seen     Narrative    Gram Stain quantification of host cells and microbiological organisms was done on a cytocentrifuged preparation.           Meningitis/Encephalitis Panel Qual PCR CSF:   Result Value Ref Range    Escherichia coli K1 Negative Negative    Haemophilus influenzae Negative Negative    Listeria monocytogenes Negative Negative    Neisseria meningitidis Negative Negative    Streptococcus agalactiae (GBS) Negative Negative    Streptococcus pneumoniae Negative Negative    Cytomegalovirus Negative Negative    Enterovirus Negative Negative    Herpes simplex virus 1 Negative Negative    Herpes simplex virus 2 Negative Negative    Human Herpes Virus 6 Negative Negative    Human parechovirus Negative Negative    Varicella zoster virus Negative Negative    Cryptococcus neoformans/gattii Negative Negative    Narrative    Assay performed using the FDA-cleared [employer]  Panel from [employer].    This test has been verified and is performed by the Infectious Diseases Diagnostic Laboratory at [facility]. This laboratory is certified under the Clinical Laboratory Improvement Amendments of 1988 (CLIA-88) as qualified to perform high complexity clinical laboratory testing.  A negative result does not rule out the presence of PCR inhibitors in the specimen or target nucleic acids are in concentration below the limit of detection of the assay.   A negative result should not rule out central nervous system infection with a high probability for meningitis or encephalitis. The assay does not test for all potential infectious agents.  Results are intended to aid in the diagnosis of illness and are meant to be used in conjunction with other clinical findings.   Malaria antigen screen    Specimen: Blood, Venous   Result Value Ref Range    Malaria Antigen Representing P. falciparum Negative Negative    Malaria Antigen Representing P. vivax, and/or P. malariae, and/or P. ovale Negative Negative    Narrative    Infection due to Plasmodium species cannot be ruled out, Malaria antigen in the sample may be below the detection limit of the assay. Negative results are confirmed by microscopy.   Blood parasitology exam    Specimen: Peripheral Blood   Result Value Ref Range    Malaria Negative Negative    Narrative    Results are preliminary and awaiting Medical Director Review

## 2022-02-25 ENCOUNTER — APPOINTMENT (OUTPATIENT)
Dept: PHYSICAL THERAPY | Facility: CLINIC | Age: 32
End: 2022-02-25
Attending: HOSPITALIST
Payer: COMMERCIAL

## 2022-02-25 ENCOUNTER — APPOINTMENT (OUTPATIENT)
Dept: OCCUPATIONAL THERAPY | Facility: CLINIC | Age: 32
End: 2022-02-25
Attending: HOSPITALIST
Payer: COMMERCIAL

## 2022-02-25 VITALS
RESPIRATION RATE: 16 BRPM | WEIGHT: 200 LBS | OXYGEN SATURATION: 97 % | HEART RATE: 59 BPM | SYSTOLIC BLOOD PRESSURE: 149 MMHG | BODY MASS INDEX: 28.63 KG/M2 | TEMPERATURE: 97.4 F | DIASTOLIC BLOOD PRESSURE: 99 MMHG | HEIGHT: 70 IN

## 2022-02-25 LAB
ANION GAP SERPL CALCULATED.3IONS-SCNC: 1 MMOL/L (ref 3–14)
BUN SERPL-MCNC: 12 MG/DL (ref 7–30)
CALCIUM SERPL-MCNC: 9.5 MG/DL (ref 8.5–10.1)
CHLORIDE BLD-SCNC: 106 MMOL/L (ref 94–109)
CO2 SERPL-SCNC: 28 MMOL/L (ref 20–32)
CREAT SERPL-MCNC: 1.13 MG/DL (ref 0.66–1.25)
ERYTHROCYTE [DISTWIDTH] IN BLOOD BY AUTOMATED COUNT: 13.2 % (ref 10–15)
GFR SERPL CREATININE-BSD FRML MDRD: 88 ML/MIN/1.73M2
GLUCOSE BLD-MCNC: 93 MG/DL (ref 70–99)
GLUCOSE BLDC GLUCOMTR-MCNC: 84 MG/DL (ref 70–99)
HCT VFR BLD AUTO: 45.4 % (ref 40–53)
HGB BLD-MCNC: 14.8 G/DL (ref 13.3–17.7)
MCH RBC QN AUTO: 25.3 PG (ref 26.5–33)
MCHC RBC AUTO-ENTMCNC: 32.6 G/DL (ref 31.5–36.5)
MCV RBC AUTO: 78 FL (ref 78–100)
PLATELET # BLD AUTO: 274 10E3/UL (ref 150–450)
POTASSIUM BLD-SCNC: 4 MMOL/L (ref 3.4–5.3)
RBC # BLD AUTO: 5.85 10E6/UL (ref 4.4–5.9)
SODIUM SERPL-SCNC: 135 MMOL/L (ref 133–144)
VZV DNA SPEC QL NAA+PROBE: NEGATIVE
WBC # BLD AUTO: 7.3 10E3/UL (ref 4–11)

## 2022-02-25 PROCEDURE — 97530 THERAPEUTIC ACTIVITIES: CPT | Mod: GP | Performed by: PHYSICAL THERAPIST

## 2022-02-25 PROCEDURE — 99217 PR OBSERVATION CARE DISCHARGE: CPT | Performed by: INTERNAL MEDICINE

## 2022-02-25 PROCEDURE — 97116 GAIT TRAINING THERAPY: CPT | Mod: GP | Performed by: PHYSICAL THERAPIST

## 2022-02-25 PROCEDURE — 36415 COLL VENOUS BLD VENIPUNCTURE: CPT | Performed by: INTERNAL MEDICINE

## 2022-02-25 PROCEDURE — 82962 GLUCOSE BLOOD TEST: CPT

## 2022-02-25 PROCEDURE — G0378 HOSPITAL OBSERVATION PER HR: HCPCS

## 2022-02-25 PROCEDURE — 84446 ASSAY OF VITAMIN E: CPT | Performed by: PSYCHIATRY & NEUROLOGY

## 2022-02-25 PROCEDURE — 85027 COMPLETE CBC AUTOMATED: CPT | Performed by: INTERNAL MEDICINE

## 2022-02-25 PROCEDURE — 82310 ASSAY OF CALCIUM: CPT | Performed by: INTERNAL MEDICINE

## 2022-02-25 PROCEDURE — 97166 OT EVAL MOD COMPLEX 45 MIN: CPT | Mod: GO | Performed by: OCCUPATIONAL THERAPIST

## 2022-02-25 PROCEDURE — 97161 PT EVAL LOW COMPLEX 20 MIN: CPT | Mod: GP | Performed by: PHYSICAL THERAPIST

## 2022-02-25 PROCEDURE — 84425 ASSAY OF VITAMIN B-1: CPT | Performed by: PSYCHIATRY & NEUROLOGY

## 2022-02-25 PROCEDURE — 97535 SELF CARE MNGMENT TRAINING: CPT | Mod: GO | Performed by: OCCUPATIONAL THERAPIST

## 2022-02-25 NOTE — PROGRESS NOTES
Orientation/Cognitive: A&OX3-4. Confusion improving per pt.  Observation Goals (Met/ Not Met): not met  Mobility Level/Assist Equipment: Ind  Fall Risk (Y/N): No  Behavior Concerns: none  Pain Management: denies, mild headache refused tylenol.  ABNL VS/O2: VSS on RA ex HTN  ABNL Lab/BG: WNL. EEG result neg & MRI result pending  Diet: Reg diet  Bowel/Bladder: Continent  Skin Concerns: WDL, Lumbar puncture site at lower back, CDI, bandaid placed.  Drains/Devices: PIV SL  Tests/Procedures for next shift: None, ID & neurology following.  Anticipated DC date: pending  Patient Stated Goal for Today:       Observation goals  PRIOR TO DISCHARGE        Comments: -diagnostic tests and consults completed and resulted- not met  -vital signs normal or at patient baseline - met  -returns to baseline functional status - not met, intermittently confusion going.  Nurse to notify provider when observation goals have been met and patient is ready for discharge.

## 2022-02-25 NOTE — PROGRESS NOTES
[facility]    Infectious Disease Progress Note    Date of Service (when I saw the patient): 02/25/2022     Assessment & Plan   Ana Negrete is a 31 year old male who was admitted on 2/24/2022.     Impression:  1. 31 y.o male with HTN  2. Recent travel form [place]. Spent 12 days in [facility]/ [facility].   3. Admitted after arrival with mental fog, fatigue, weakness.   4. No fevers or chills, some reports of headache no neck stiffness, some dizziness, ? Mechanical fall before the trip is being reported.   5. Malaria screen negative.   6. Meningitis panel negative, CSF with 0 WBC no meningitis.   7. On focal neurological deficits on exam, completely alert and oriented x3. No rashes.   10. Patient born in [facility], [place] when 10 years old, last trip to [place] before this one was in 2007. No prior history of pneumonia.       Recommendations:   Low suspicion  for meningitis with that LP, WBC 0, meningitis - encephalitis panel negative.   No fever to suspect malaria, and parasite smear  and malaria antigen negative.   CT scan negative, MRI Negative    No CBC abnormalities.   Appreciate  neurology eval.     Significant other updated bedside     Discussed with ORQUIDEA Moss MD    Interval History   Afebrile   Labs reviewed   Fully alert and awake   Anxious       Physical Exam   Temp: 97.8  F (36.6  C) Temp src: Oral BP: (!) 150/92 Pulse: 62   Resp: 16 SpO2: 99 % O2 Device: None (Room air)    Vitals:    02/24/22 0007   Weight: 90.7 kg (200 lb)     Vital Signs with Ranges  Temp:  [97.8  F (36.6  C)-98.8  F (37.1  C)] 97.8  F (36.6  C)  Pulse:  [61-97] 62  Resp:  [16-18] 16  BP: (142-157)/() 150/92  SpO2:  [96 %-99 %] 99 %    Constitutional: Awake, alert, cooperative, no apparent distress  Lungs: Clear to auscultation bilaterally, no crackles or wheezing  Cardiovascular: Regular rate and rhythm, normal S1 and S2, and no murmur noted  Abdomen: Normal bowel sounds, soft,  non-distended, non-tender  Skin: No rashes, no cyanosis, no edema  Other:    Medications         Data   All microbiology laboratory data reviewed.  Recent Labs   Lab Test 02/25/22  0611 02/24/22  0114   WBC 7.3 6.5   HGB 14.8 15.9   HCT 45.4 49.1   MCV 78 78    309     Recent Labs   Lab Test 02/25/22  0611 02/24/22  0113   CR 1.13 1.23     Recent Labs   Lab Test 02/24/22  1754   SED 16*     No lab results found.    Invalid input(s): UC

## 2022-02-25 NOTE — PLAN OF CARE
Physical Therapy Discharge Summary    Reason for therapy discharge:    All goals and outcomes met, no further needs identified.    Progress towards therapy goal(s). See goals on Care Plan in [facility] electronic health record for goal details.  Goals met    Therapy recommendation(s):    No further therapy is recommended.

## 2022-02-25 NOTE — PROGRESS NOTES
"PRIMARY DIAGNOSIS: \"GENERIC\" NURSING  OUTPATIENT/OBSERVATION GOALS TO BE MET BEFORE DISCHARGE:  1. ADLs back to baseline: Yes    2. Activity and level of assistance: Ambulating independently.    3. Pain status: Pain free.    4. Return to near baseline physical activity: Yes     Discharge Planner Nurse   Safe discharge environment identified: No  Barriers to discharge: Yes PT/OT consult       Entered by: Marti Kidd 02/25/2022 9:23 AM     Please review provider order for any additional goals.   Nurse to notify provider when observation goals have been met and patient is ready for discharge.  "

## 2022-02-25 NOTE — PLAN OF CARE
Occupational Therapy Discharge Summary    Reason for therapy discharge:    Discharged to home with outpatient therapy.  All goals and outcomes met, no further needs identified.    Progress towards therapy goal(s). See goals on Care Plan in [facility] electronic health record for goal details.  Goals met    Therapy recommendation(s):    Continued therapy is recommended.  Rationale/Recommendations:  Patient appears at baseline for mobility and ADL performance, presenting with cognitive impairments this date. SLUMs score of 22/30, current score in mild neurocognitive impairment category. Recommend OP OT for further evaluation and treatment. Recommend pt have assist with IADLs including driving and medication management, pt's significant other is able to assist with this.

## 2022-02-25 NOTE — PROGRESS NOTES
"   02/25/22 1100   Quick Adds   Type of Visit Initial PT Evaluation       Present no   Living Environment   People in Home spouse   Current Living Arrangements apartment   Home Accessibility no concerns   Transportation Anticipated family or friend will provide   Living Environment Comments Pt lives with his spouse in an apartment. Pt reports no concerns regarding stairs. Pt reports his spouse will pick him up upon discharge and provide 24/7 assist as needed. Pt is from [place] and plans on flying back upon discharge.   Self-Care   Usual Activity Tolerance good   Current Activity Tolerance good   Regular Exercise No   Equipment Currently Used at Home none   Fall history within last six months yes   Number of times patient has fallen within last six months 1   Activity/Exercise/Self-Care Comment Pt reports being IND at baseline with all ADLs. Pt ambulates without an AD and is not limited with mobility.   General Information   Onset of Illness/Injury or Date of Surgery 02/25/22   Referring Physician Jameson Gaitan MD   Patient/Family Therapy Goals Statement (PT) \"To go home\"   Pertinent History of Current Problem (include personal factors and/or comorbidities that impact the POC) Per Chart: Ana Negrete is a 31 year old male admitted on 2/24/2022. He has history of HTN and travelled to [place] on 12th and has no presented en route to [place] with CC of fatigue and confusion .    Existing Precautions/Restrictions fall   Weight-Bearing Status - LLE full weight-bearing   Weight-Bearing Status - RLE full weight-bearing   Cognition   Orientation Status (Cognition) oriented x 3   Pain Assessment   Patient Currently in Pain No   Integumentary/Edema   Integumentary/Edema no deficits were identifed   Posture    Posture Forward head position   Range of Motion (ROM)   Range of Motion ROM is WFL   Strength (Manual Muscle Testing)   Strength (Manual Muscle Testing) strength is WFL;Able to perform R SLR;Able to " perform L SLR   Bed Mobility   Comment, (Bed Mobility) Supine>sit w/ IND   Transfers   Comment, (Transfers) Sit>stand w/o AD and IND   Gait/Stairs (Locomotion)   [place] Level (Gait) supervision   Assistive Device (Gait) other (see comments)  (no AD)   Distance in Feet (Required for LE Total Joints) 300'  (10' eval)   Comment, (Gait/Stairs) Pt ambulated ~10' w/o AD and SBA for eval. Pt was steady throughout and had no LOB.   Balance   Balance Comments Pt presented with mild posterior sway in standing but overall steady.   Sensory Examination   Sensory Perception patient reports no sensory changes   Clinical Impression   Criteria for Skilled Therapeutic Intervention Yes, treatment indicated   PT Diagnosis (PT) Impaired gait   Influenced by the following impairments Decreased activity tolerance   Functional limitations due to impairments Impaired functional mobility   Clinical Presentation (PT Evaluation Complexity) Stable/Uncomplicated   Clinical Presentation Rationale Clinical Judgement   Clinical Decision Making (Complexity) low complexity   Planned Therapy Interventions (PT) bed mobility training;balance training;gait training;patient/family education;strengthening;transfer training   Risk & Benefits of therapy have been explained evaluation/treatment results reviewed;care plan/treatment goals reviewed;risks/benefits reviewed;current/potential barriers reviewed;participants voiced agreement with care plan;participants included;patient;spouse/significant other   PT Discharge Planning   PT Discharge Recommendation (DC Rec) home   PT Rationale for DC Rec Pt appears at baseline for mobility. Pt demonstrates safe and effective techniques with all functional mobility. Pt will have 24/7 assist from significant other as needed.   PT Brief overview of current status Supine>sit w/ IND; sit>stand w/o AD and IND; gait w/o AD and SBA   Total Evaluation Time   Total Evaluation Time (Minutes) 10   Physical Therapy Goals    PT Frequency One time eval and treatment only   PT Predicated Duration/Target Date for Goal Attainment 02/28/22   PT Goals Bed Mobility;Gait;Transfers   PT: Bed Mobility Completed   PT: Transfers Completed   PT: Gait Completed

## 2022-02-25 NOTE — PROGRESS NOTES
Observation goals  PRIOR TO DISCHARGE        Comments: -diagnostic tests and consults completed and resulted- not met  -vital signs normal or at patient baseline - met  -returns to baseline functional status - not met, intermittently confusion going.  Nurse to notify provider when observation goals have been met and patient is ready for discharge.

## 2022-02-25 NOTE — PLAN OF CARE
Orientation/Cognitive: AOx4  Observation Goals (Met/ Not Met): not met  Mobility Level/Assist Equipment: ind  Fall Risk (Y/N): N  Behavior Concerns: green  Pain Management: denies  ABNL VS/O2: HTN  ABNL Lab/BG: NA  Diet: Regular  Bowel/Bladder: continent  Skin Concerns: LP site, CDI   Drains/Devices: PIV SL  Tests/Procedures for next shift: labs  Anticipated DC date: 1-2 d pend workup  Patient Stated Goal for Today: Get sleep    Observation goals  PRIOR TO DISCHARGE       Comments: -diagnostic tests and consults completed and resulted: not met   -vital signs normal or at patient baseline: partially met, HTN   -returns to baseline functional status: met   Nurse to notify provider when observation goals have been met and patient is ready for discharge.

## 2022-02-25 NOTE — DISCHARGE SUMMARY
[facility]  Discharge Summary        Ana Negrete MRN# [id number]   YOB: 1990 Age: 31 year old     Date of Admission:  2/24/2022  Date of Discharge:  2/25/2022  Admitting Physician:  No admitting provider for patient encounter.  Discharge Physician: Dayton Loera MD  Discharging Service: Hospitalist     Primary Provider:  No Ref-Primary, Physician  Primary Care Physician Phone Number: None         Discharge Diagnoses/Problem Oriented Hospital Course (Providers):    Ana Negrete was admitted on 2/24/2022 by No admitting provider for patient encounter. and I would refer you to their history and physical.  The following problems were addressed during his hospitalization:    Ana Negrete is a 31 year old male admitted on 2/24/2022. He has history of HTN and travelled to [place] on 12th and has no presented en route to [place] with CC of fatigue and confusion .      1) Encephalopathy  -On admission presented with recent travel to [place] and has intermittent confusion for the past 4 to 5 days without any fever or chills or neck rigidity  -On my exam  patient was able to tell me where he was, name of  presidents of [place], he was able to tell me where he lives, does not have any photophobia  -His infectious work-up has all been negative including normal WBC count, negative for COVID-19 and influenza, CT scan of the head did not show any evidence of brain bleed and his UA is negative for infection   -Low suspicion  for meningitis with that LP, WBC 0, meningitis - encephalitis panel negative.   No fever to suspect malaria, and parasite smear ( prelim) and malaria antigen negative. Will follow up on the final.   CT scan negative, MRI is normal  No CBC abnormalities.   Seen by neurology, EEG normal  Increased stress and psychosocial stressors  May need psychiatric assessment     2) Hypertension  -Patient does have history of hypertension and is not currently on any blood  "pressure medications and we will monitor his blood pressure closely     Clinically Significant Risk Factors Present on Admission             # Overweight: Estimated body mass index is 28.7 kg/m  as calculated from the following:    Height as of this encounter: 1.778 m (5' 10\").    Weight as of this encounter: 90.7 kg (200 lb).            Disposition Plan     Expected Discharge:    Anticipated discharge location:  Awaiting care coordination huddle  Delays:                  Code Status:      Full Code         Important Results:      See below         Pending Results:        Unresulted Labs Ordered in the Past 30 Days of this Admission     Date and Time Order Name Status Description    2/25/2022 12:02 AM Vitamin E In process     2/25/2022 12:02 AM Vitamin B1 whole blood In process     2/24/2022  6:14 AM West Nile Virus IgG and IgM CSF: In process     2/24/2022  6:14 AM Cerebrospinal fluid Aerobic Bacterial Culture Routine Preliminary                Discharge Instructions and Follow-Up:      Follow-up Appointments     Follow-up and recommended labs and tests       Follow up with primary care provider, Physician No Ref-Primary, for   hospital follow up.                  Discharge Disposition:      Discharged to home         Discharge Medications:      There are no discharge medications for this patient.           Allergies:       No Known Allergies         Consultations This Hospital Stay:      Consultation during this admission received from ID and Neurology          Discharge Orders for Skilled Facility (from Discharge Orders):        After Care Instructions     Activity      Your activity upon discharge: activity as tolerated         Diet      Follow this diet upon discharge: Orders Placed This Encounter      Regular Diet Adult                    Rehab orders for Skilled Facility (from Discharge Orders):      Referrals     Future Labs/Procedures    Occupational Therapy Referral     Process Instructions:    Work " Related Injury: Functional Capacity and Work Conditioning are only   offered at [facility] and [facility] (service can be provided by PT   or OT).    *This therapy referral will be filtered to a centralized scheduling office   at [facility] and the patient will receive a call to   schedule an appointment at a [facility] location most convenient for them. *      Comments:    Please be aware that coverage of these services is subject to the terms   and limitations of your health insurance plan.  Call member services at   your health plan with any benefit or coverage questions.  If you have not heard from the scheduling office within 2 business days,   please call [phone] for [facility], [phone] for [facility] and   [phone] for [place].             Discharge Time:       Greater than 30 minutes.        Image Results From This Hospital Stay (For Non-EPIC Providers):        Results for orders placed or performed during the hospital encounter of 02/24/22   Head CT w/o contrast    Narrative    EXAM: CT HEAD W/O CONTRAST  LOCATION: [facility]  DATE/TIME: 2/24/2022 1:18 AM    INDICATION: Confusion and fatigue  COMPARISON: None.  TECHNIQUE: Routine CT Head without IV contrast. Multiplanar reformats. Dose reduction techniques were used.    FINDINGS:  INTRACRANIAL CONTENTS: No intracranial hemorrhage, extraaxial collection, or mass effect.  No CT evidence of acute infarct. Normal parenchymal attenuation. Normal ventricles and sulci.     VISUALIZED ORBITS/SINUSES/MASTOIDS: No intraorbital abnormality. No paranasal sinus mucosal disease. No middle ear or mastoid effusion.    BONES/SOFT TISSUES: No acute abnormality.      Impression    IMPRESSION:  1.  Unremarkable noncontrast head CT.   MR Brain w/o & w Contrast    Narrative    MRI BRAIN WITHOUT AND WITH CONTRAST  2/24/2022 12:06 PM    HISTORY:  Mental status change, unknown cause     TECHNIQUE:   Multiplanar, multisequence MRI of the brain without and  with gadobutrol (GADAVIST) injection 9 mL    COMPARISON: Head CT 2/24/2022    FINDINGS:  The cerebral hemispheres, brainstem, and cerebellum demonstrate normal  morphology and signal. No evidence of ischemia, hemorrhage, mass, mass  effect, or hydrocephalus. No abnormal enhancement or diffusion  obstruction.    Marrow signal is within normal limits. The visualized paranasal  sinuses, tympanic cavities, and mastoid cavities are unremarkable.      Impression    IMPRESSION:  Unremarkable MRI of the head.    JOSH BARRY MD         SYSTEM ID:  [id number]           Most Recent Lab Results In EPIC (For Non-EPIC Providers):    Most Recent 3 CBC's:  Recent Labs   Lab Test 02/25/22  0611 02/24/22  0114   WBC 7.3 6.5   HGB 14.8 15.9   MCV 78 78    309      Most Recent 3 BMP's:  Recent Labs   Lab Test 02/25/22  1537 02/25/22  0611 02/24/22  0113   NA  --  135 137   POTASSIUM  --  4.0 4.5   CHLORIDE  --  106 105   CO2  --  28 28   BUN  --  12 12   CR  --  1.13 1.23   ANIONGAP  --  1* 4   BIB  --  9.5 9.2   GLC 84 93 101*     Most Recent 3 Troponin's:No lab results found.    Invalid input(s): TROP, TROPONINIES  Most Recent 3 INR's:No lab results found.  Most Recent 2 LFT's:  Recent Labs   Lab Test 02/24/22  1754   AST 12   ALT 25   ALKPHOS 80   BILITOTAL 0.6     Most Recent Cholesterol Panel:No lab results found.  Most Recent 6 Bacteria Isolates From Any Culture (See EPIC Reports for Culture Details):No lab results found.  Most Recent TSH, T4 and HgbA1c:  Recent Labs   Lab Test 02/24/22  0113   TSH 1.31

## 2022-02-25 NOTE — PROGRESS NOTES
"PRIMARY DIAGNOSIS: \"GENERIC\" NURSING  OUTPATIENT/OBSERVATION GOALS TO BE MET BEFORE DISCHARGE:  1. ADLs back to baseline: Yes    2. Activity and level of assistance: Ambulating independently.    3. Pain status: Pain free.    4. Return to near baseline physical activity: Yes     Discharge Planner Nurse   Safe discharge environment identified: No  Barriers to discharge: Yes PT/OT consult       Entered by: Marti Kidd 02/25/2022      Please review provider order for any additional goals.   Nurse to notify provider when observation goals have been met and patient is ready for discharge.  "

## 2022-02-25 NOTE — PLAN OF CARE
Patient is A/O x4. Ind with mobility. Vitally stable. Neurologically intact. Evaluated by PT who signed off. Declined OT eval due to fatigue, OT to reapproach.Taken off droplet and contact precautions by ID. Neurology following.

## 2022-02-25 NOTE — PROGRESS NOTES
"PRIMARY DIAGNOSIS: \"GENERIC\" NURSING  OUTPATIENT/OBSERVATION GOALS TO BE MET BEFORE DISCHARGE:  1. ADLs back to baseline: Yes    2. Activity and level of assistance: Ambulating independently.    3. Pain status: Pain free.    4. Return to near baseline physical activity: Yes     Discharge Planner Nurse   Safe discharge environment identified: No  Barriers to discharge:No.      Entered by: Emilee Razo 02/25/2022 4:24 PM     Please review provider order for any additional goals.   Nurse to notify provider when observation goals have been met and patient is ready for discharge.  "

## 2022-02-25 NOTE — PLAN OF CARE
Goal Outcome Evaluation:  progressing.     A/o X4. Intermittently episode of spacing out. All imaging negative. Tolerating reg diet. Up with adlib.  Plan to discharge home with wife. Discharge paper work reviewed with pt. Verbalizes understanding. PIV removed. Follow up aware. Pt is requesting a letter for work. MD paged.    pt discharge home with wife via wheelchair and recommended close follow up with PCP.

## 2022-02-25 NOTE — PROGRESS NOTES
Observation goals  PRIOR TO DISCHARGE       Comments: -diagnostic tests and consults completed and resulted: not met   -vital signs normal or at patient baseline: partially met, HTN   -returns to baseline functional status: met   Nurse to notify provider when observation goals have been met and patient is ready for discharge.

## 2022-02-25 NOTE — PROGRESS NOTES
02/25/22 1417   Quick Adds   Type of Visit Initial Occupational Therapy Evaluation   Living Environment   People in Home spouse   Current Living Arrangements apartment   Home Accessibility no concerns   Transportation Anticipated family or friend will provide   Living Environment Comments Pt lives in apartment, works a  in [place]. Pt was on a layover through [place] on his way home from a trip in [facility]   Self-Care   Usual Activity Tolerance excellent   Current Activity Tolerance good   Regular Exercise No   Equipment Currently Used at Home none   Fall history within last six months yes   Number of times patient has fallen within last six months 1   Activity/Exercise/Self-Care Comment Pt IND at baseline with mobility and I/ADLs, works as a . Pt's SO reporting that he told her he fell several weeks ago but was unable to recall how it happened   Instrumental Activities of Daily Living (IADL)   Previous Responsibilities meal prep;housekeeping;laundry;medication management;shopping;yardwork;finances;driving;work   General Information   Onset of Illness/Injury or Date of Surgery 02/24/22   Referring Physician Jameson Gaitan MD   Additional Occupational Profile Info/Pertinent History of Current Problem Pt is 32 yo male with PMH of HTN, admitted with 4 day onset of AMS and fatigue on layover when travelling back to [place] after 12 day trip in [facility].    Cognitive Status Examination   Orientation Status orientation to person, place and time   Affect/Mental Status (Cognitive) flat/blunted affect   Follows Commands follows one-step commands;75-90% accuracy;delayed response/completion;increased processing time needed   Executive Function Deficit problem-solving/reasoning;organization/sequencing;minimal deficit;self-monitoring/self-correction   Cognitive Status Comments Pt pleasant and cooperative during session, somewhat flat. Pt and SO reporting that for 4-5 days prior to admission, pt  "had been \"off.\" Would be unable to remember how he got somewhere, etc. Pt scoring 22 out of 30 on SLUMs, current score in range of mild neurocognitive deficits   Visual Perception   Visual Impairment/Limitations WFL   Sensory   Sensory Quick Adds No deficits were identified   Pain Assessment   Patient Currently in Pain No   Integumentary/Edema   Integumentary/Edema no deficits were identifed   Posture   Posture not impaired   Range of Motion Comprehensive   General Range of Motion bilateral upper extremity ROM WNL   Strength Comprehensive (MMT)   General Manual Muscle Testing (MMT) Assessment no strength deficits identified   Muscle Tone Assessment   Muscle Tone Quick Adds No deficits were identified   Coordination   Upper Extremity Coordination No deficits were identified   Bed Mobility   Bed Mobility supine-sit;sit-supine   Supine-Sit [place] (Bed Mobility) independent   Sit-Supine [place] (Bed Mobility) independent   Transfers   Transfers sit-stand transfer   Sit-Stand Transfer   Sit-Stand [place] (Transfers) independent   Clinical Impression   Criteria for Skilled Therapeutic Interventions Met (OT) Yes, treatment indicated   OT Diagnosis Impaired cognition impacting safety with I/ADLs   OT Problem List-Impairments impacting ADL problems related to;cognition   Assessment of Occupational Performance 1-3 Performance Deficits   Identified Performance Deficits driving, med mgmt, finances   Planned Therapy Interventions (OT) cognition   Clinical Decision Making Complexity (OT) low complexity   Risk & Benefits of therapy have been explained evaluation/treatment results reviewed;care plan/treatment goals reviewed;risks/benefits reviewed;current/potential barriers reviewed;participants voiced agreement with care plan;participants included;patient;spouse/significant other   OT Discharge Planning   OT Discharge Recommendation (DC Rec) home with assist;home with outpatient occupational therapy   OT Rationale " for DC Rec Pt appears at baseline for functional mobility and I/ADLs, limited by cognitive impairment. Pt scoring 22 out of 30 on SLUMs cognitive screen, current score in range of mild neurocognitive impairments. Recommend OP OT for further cognitive evaluation and treatment. Recommend assist with higher level I/ADLs including driving, medication management, etc until further assessed by OP. Pt's SO stating she can assist with I/ADLs   Total Evaluation Time (Minutes)   Total Evaluation Time (Minutes) 10   OT Goals   Therapy Frequency (OT) One time eval and treatment   OT Predicated Duration/Target Date for Goal Attainment 02/25/22   OT Goals Cognition   OT: Cognitive Goal Met

## 2022-02-27 LAB
A-TOCOPHEROL VIT E SERPL-MCNC: 9.6 MG/L
BETA+GAMMA TOCOPHEROL SERPL-MCNC: 1.1 MG/L

## 2022-02-28 LAB
WNV IGG CSF IA-ACNC: 0.12 IV
WNV IGM CSF IA-ACNC: 0 IV

## 2022-03-01 LAB
BACTERIA CSF CULT: NO GROWTH
GRAM STAIN RESULT: NORMAL
GRAM STAIN RESULT: NORMAL
VIT B1 PYROPHOSHATE BLD-SCNC: 101 NMOL/L

## 2022-04-03 ENCOUNTER — HEALTH MAINTENANCE LETTER (OUTPATIENT)
Age: 32
End: 2022-04-03

## 2022-10-03 ENCOUNTER — HEALTH MAINTENANCE LETTER (OUTPATIENT)
Age: 32
End: 2022-10-03

## 2023-05-21 ENCOUNTER — HEALTH MAINTENANCE LETTER (OUTPATIENT)
Age: 33
End: 2023-05-21

## 2024-07-28 ENCOUNTER — HEALTH MAINTENANCE LETTER (OUTPATIENT)
Age: 34
End: 2024-07-28